# Patient Record
Sex: FEMALE | Race: WHITE | HISPANIC OR LATINO | Employment: FULL TIME | URBAN - METROPOLITAN AREA
[De-identification: names, ages, dates, MRNs, and addresses within clinical notes are randomized per-mention and may not be internally consistent; named-entity substitution may affect disease eponyms.]

---

## 2019-09-16 ENCOUNTER — INITIAL PRENATAL (OUTPATIENT)
Dept: OBSTETRICS AND GYNECOLOGY | Facility: CLINIC | Age: 29
End: 2019-09-16
Payer: MEDICAID

## 2019-09-16 VITALS — SYSTOLIC BLOOD PRESSURE: 112 MMHG | DIASTOLIC BLOOD PRESSURE: 62 MMHG | WEIGHT: 130.06 LBS

## 2019-09-16 DIAGNOSIS — Z3A.11 11 WEEKS GESTATION OF PREGNANCY: Primary | ICD-10-CM

## 2019-09-16 DIAGNOSIS — Z34.81 NORMAL PREGNANCY IN MULTIGRAVIDA IN FIRST TRIMESTER: ICD-10-CM

## 2019-09-16 DIAGNOSIS — O02.0 BLIGHTED OVUM: ICD-10-CM

## 2019-09-16 PROCEDURE — 99204 PR OFFICE/OUTPT VISIT, NEW, LEVL IV, 45-59 MIN: ICD-10-PCS | Mod: S$PBB,TH,25, | Performed by: OBSTETRICS & GYNECOLOGY

## 2019-09-16 PROCEDURE — 99999 PR PBB SHADOW E&M-NEW PATIENT-LVL II: CPT | Mod: PBBFAC,,, | Performed by: OBSTETRICS & GYNECOLOGY

## 2019-09-16 PROCEDURE — 99999 PR PBB SHADOW E&M-NEW PATIENT-LVL II: ICD-10-PCS | Mod: PBBFAC,,, | Performed by: OBSTETRICS & GYNECOLOGY

## 2019-09-16 PROCEDURE — 76815 OB US LIMITED FETUS(S): CPT | Mod: PBBFAC | Performed by: OBSTETRICS & GYNECOLOGY

## 2019-09-16 PROCEDURE — 76815 OB US LIMITED FETUS(S): CPT | Mod: 26,S$PBB,, | Performed by: OBSTETRICS & GYNECOLOGY

## 2019-09-16 PROCEDURE — 76815 PR  US,PREGNANT UTERUS,LIMITED, 1/> FETUSES: ICD-10-PCS | Mod: 26,S$PBB,, | Performed by: OBSTETRICS & GYNECOLOGY

## 2019-09-16 PROCEDURE — 99204 OFFICE O/P NEW MOD 45 MIN: CPT | Mod: S$PBB,TH,25, | Performed by: OBSTETRICS & GYNECOLOGY

## 2019-09-16 PROCEDURE — 99202 OFFICE O/P NEW SF 15 MIN: CPT | Mod: PBBFAC,TH,25 | Performed by: OBSTETRICS & GYNECOLOGY

## 2019-09-16 RX ORDER — PROMETHAZINE HYDROCHLORIDE 25 MG/1
TABLET ORAL
Qty: 60 TABLET | Refills: 1 | Status: SHIPPED | OUTPATIENT
Start: 2019-09-16

## 2019-09-16 NOTE — PROGRESS NOTES
Limited transvag sono for FHT:  No fetal pole seen on gestation sac measuring between 6 w 2 d and 8 wk 4 d    Discussed using [place] dx of probable blighted ovum.  Pt to have scheduled sono for confirmation on 9/19/19 with MFM.  Discussed expectant management vs induction with cytotec vs d&c; pt to f/u 1 wk to discuss options    Rx Phenergan for nausea and Rx PNV

## 2019-09-19 ENCOUNTER — PROCEDURE VISIT (OUTPATIENT)
Dept: MATERNAL FETAL MEDICINE | Facility: CLINIC | Age: 29
End: 2019-09-19
Payer: MEDICAID

## 2019-09-19 DIAGNOSIS — Z3A.11 11 WEEKS GESTATION OF PREGNANCY: ICD-10-CM

## 2019-09-19 PROCEDURE — 99499 NO LOS: ICD-10-PCS | Mod: S$PBB,,, | Performed by: OBSTETRICS & GYNECOLOGY

## 2019-09-19 PROCEDURE — 76801 OB US < 14 WKS SINGLE FETUS: CPT | Mod: 26,S$PBB,, | Performed by: OBSTETRICS & GYNECOLOGY

## 2019-09-19 PROCEDURE — 99499 UNLISTED E&M SERVICE: CPT | Mod: S$PBB,,, | Performed by: OBSTETRICS & GYNECOLOGY

## 2019-09-19 PROCEDURE — 76801 OB US < 14 WKS SINGLE FETUS: CPT | Mod: PBBFAC,PO | Performed by: OBSTETRICS & GYNECOLOGY

## 2019-09-19 PROCEDURE — 76801 PR US, OB <14WKS, TRANSABD, SINGLE GESTATION: ICD-10-PCS | Mod: 26,S$PBB,, | Performed by: OBSTETRICS & GYNECOLOGY

## 2019-09-23 ENCOUNTER — ROUTINE PRENATAL (OUTPATIENT)
Dept: OBSTETRICS AND GYNECOLOGY | Facility: CLINIC | Age: 29
End: 2019-09-23
Payer: MEDICAID

## 2019-09-23 VITALS — SYSTOLIC BLOOD PRESSURE: 106 MMHG | WEIGHT: 129.88 LBS | DIASTOLIC BLOOD PRESSURE: 51 MMHG

## 2019-09-23 DIAGNOSIS — O02.0 BLIGHTED OVUM: Primary | ICD-10-CM

## 2019-09-23 PROBLEM — Z34.81 NORMAL PREGNANCY IN MULTIGRAVIDA IN FIRST TRIMESTER: Status: RESOLVED | Noted: 2019-09-16 | Resolved: 2019-09-23

## 2019-09-23 PROCEDURE — 99999 PR PBB SHADOW E&M-EST. PATIENT-LVL II: CPT | Mod: PBBFAC,,, | Performed by: OBSTETRICS & GYNECOLOGY

## 2019-09-23 PROCEDURE — 99213 PR OFFICE/OUTPT VISIT, EST, LEVL III, 20-29 MIN: ICD-10-PCS | Mod: S$PBB,TH,, | Performed by: OBSTETRICS & GYNECOLOGY

## 2019-09-23 PROCEDURE — 99213 OFFICE O/P EST LOW 20 MIN: CPT | Mod: S$PBB,TH,, | Performed by: OBSTETRICS & GYNECOLOGY

## 2019-09-23 PROCEDURE — 76815 OB US LIMITED FETUS(S): CPT | Mod: PBBFAC | Performed by: OBSTETRICS & GYNECOLOGY

## 2019-09-23 PROCEDURE — 99999 PR PBB SHADOW E&M-EST. PATIENT-LVL II: ICD-10-PCS | Mod: PBBFAC,,, | Performed by: OBSTETRICS & GYNECOLOGY

## 2019-09-23 PROCEDURE — 99212 OFFICE O/P EST SF 10 MIN: CPT | Mod: PBBFAC,TH | Performed by: OBSTETRICS & GYNECOLOGY

## 2019-09-23 RX ORDER — MISOPROSTOL 200 UG/1
400 TABLET ORAL EVERY 6 HOURS
Qty: 8 TABLET | Refills: 0 | Status: ON HOLD | OUTPATIENT
Start: 2019-09-23 | End: 2019-09-25 | Stop reason: HOSPADM

## 2019-09-23 RX ORDER — IBUPROFEN 800 MG/1
800 TABLET ORAL EVERY 8 HOURS PRN
Qty: 30 TABLET | Refills: 1 | Status: SHIPPED | OUTPATIENT
Start: 2019-09-23 | End: 2020-09-22

## 2019-09-23 NOTE — PROGRESS NOTES
Pt here for f/u of blighted ovum, pt states she has been having cramps and mild bleeding since 9/20/19.    Limited transvag sono:  Gestation sac seen between 7 and 9 wks, no fetal pole or any contents seen within sac.    Discussed expectant management vs cytotec vs D&C.  Pt chooses cytotec, 400 mcg qid x 24 hours, pt to f/u in 1 wk for repeat sono.  If blighted ovum still present, pt to have D&C next week.

## 2019-09-24 ENCOUNTER — HOSPITAL ENCOUNTER (EMERGENCY)
Facility: HOSPITAL | Age: 29
Discharge: HOME | End: 2019-09-25
Attending: EMERGENCY MEDICINE
Payer: MEDICAID

## 2019-09-24 DIAGNOSIS — N93.9 VAGINAL BLEEDING: ICD-10-CM

## 2019-09-24 DIAGNOSIS — O03.4 INCOMPLETE ABORTION: Primary | ICD-10-CM

## 2019-09-24 DIAGNOSIS — O02.0 BLIGHTED OVUM: ICD-10-CM

## 2019-09-24 LAB
ABO + RH BLD: NORMAL
ALBUMIN SERPL BCP-MCNC: 3.7 G/DL (ref 3.5–5.2)
ALP SERPL-CCNC: 62 U/L (ref 55–135)
ALT SERPL W/O P-5'-P-CCNC: 21 U/L (ref 10–44)
ANION GAP SERPL CALC-SCNC: 8 MMOL/L (ref 8–16)
AST SERPL-CCNC: 23 U/L (ref 10–40)
BACTERIA #/AREA URNS HPF: ABNORMAL /HPF
BASOPHILS # BLD AUTO: 0.01 K/UL (ref 0–0.2)
BASOPHILS NFR BLD: 0.1 % (ref 0–1.9)
BILIRUB SERPL-MCNC: 0.4 MG/DL (ref 0.1–1)
BILIRUB UR QL STRIP: NEGATIVE
BLD GP AB SCN CELLS X3 SERPL QL: NORMAL
BUN SERPL-MCNC: 8 MG/DL (ref 6–20)
CALCIUM SERPL-MCNC: 8.8 MG/DL (ref 8.7–10.5)
CHLORIDE SERPL-SCNC: 105 MMOL/L (ref 95–110)
CLARITY UR: ABNORMAL
CO2 SERPL-SCNC: 20 MMOL/L (ref 23–29)
COLOR UR: ABNORMAL
CREAT SERPL-MCNC: 0.7 MG/DL (ref 0.5–1.4)
DIFFERENTIAL METHOD: ABNORMAL
EOSINOPHIL # BLD AUTO: 0.1 K/UL (ref 0–0.5)
EOSINOPHIL NFR BLD: 1.1 % (ref 0–8)
ERYTHROCYTE [DISTWIDTH] IN BLOOD BY AUTOMATED COUNT: 12.9 % (ref 11.5–14.5)
EST. GFR  (AFRICAN AMERICAN): >60 ML/MIN/1.73 M^2
EST. GFR  (NON AFRICAN AMERICAN): >60 ML/MIN/1.73 M^2
GLUCOSE SERPL-MCNC: 113 MG/DL (ref 70–110)
GLUCOSE UR QL STRIP: ABNORMAL
HCG INTACT+B SERPL-ACNC: 295 MIU/ML
HCT VFR BLD AUTO: 28.4 % (ref 37–48.5)
HGB BLD-MCNC: 9.6 G/DL (ref 12–16)
HGB UR QL STRIP: ABNORMAL
HYALINE CASTS #/AREA URNS LPF: 0 /LPF
KETONES UR QL STRIP: NEGATIVE
LEUKOCYTE ESTERASE UR QL STRIP: ABNORMAL
LIPASE SERPL-CCNC: 7 U/L (ref 4–60)
LYMPHOCYTES # BLD AUTO: 2 K/UL (ref 1–4.8)
LYMPHOCYTES NFR BLD: 22.3 % (ref 18–48)
MCH RBC QN AUTO: 30.4 PG (ref 27–31)
MCHC RBC AUTO-ENTMCNC: 33.8 G/DL (ref 32–36)
MCV RBC AUTO: 90 FL (ref 82–98)
MICROSCOPIC COMMENT: ABNORMAL
MONOCYTES # BLD AUTO: 0.8 K/UL (ref 0.3–1)
MONOCYTES NFR BLD: 8.4 % (ref 4–15)
NEUTROPHILS # BLD AUTO: 6.1 K/UL (ref 1.8–7.7)
NEUTROPHILS NFR BLD: 68.2 % (ref 38–73)
NITRITE UR QL STRIP: NEGATIVE
PH UR STRIP: 6 [PH] (ref 5–8)
PLATELET # BLD AUTO: 260 K/UL (ref 150–350)
PMV BLD AUTO: 10.3 FL (ref 9.2–12.9)
POTASSIUM SERPL-SCNC: 3.8 MMOL/L (ref 3.5–5.1)
PROT SERPL-MCNC: 6.5 G/DL (ref 6–8.4)
PROT UR QL STRIP: ABNORMAL
RBC # BLD AUTO: 3.16 M/UL (ref 4–5.4)
RBC #/AREA URNS HPF: >100 /HPF (ref 0–4)
SODIUM SERPL-SCNC: 133 MMOL/L (ref 136–145)
SP GR UR STRIP: 1.01 (ref 1–1.03)
URN SPEC COLLECT METH UR: ABNORMAL
UROBILINOGEN UR STRIP-ACNC: NEGATIVE EU/DL
WBC # BLD AUTO: 8.97 K/UL (ref 3.9–12.7)
WBC #/AREA URNS HPF: 0 /HPF (ref 0–5)

## 2019-09-24 PROCEDURE — 83690 ASSAY OF LIPASE: CPT

## 2019-09-24 PROCEDURE — 80053 COMPREHEN METABOLIC PANEL: CPT

## 2019-09-24 PROCEDURE — 63600175 PHARM REV CODE 636 W HCPCS: Performed by: EMERGENCY MEDICINE

## 2019-09-24 PROCEDURE — 96374 THER/PROPH/DIAG INJ IV PUSH: CPT

## 2019-09-24 PROCEDURE — 96361 HYDRATE IV INFUSION ADD-ON: CPT

## 2019-09-24 PROCEDURE — 81000 URINALYSIS NONAUTO W/SCOPE: CPT

## 2019-09-24 PROCEDURE — 96375 TX/PRO/DX INJ NEW DRUG ADDON: CPT | Mod: 59

## 2019-09-24 PROCEDURE — 85025 COMPLETE CBC W/AUTO DIFF WBC: CPT

## 2019-09-24 PROCEDURE — 86850 RBC ANTIBODY SCREEN: CPT

## 2019-09-24 PROCEDURE — 84702 CHORIONIC GONADOTROPIN TEST: CPT

## 2019-09-24 PROCEDURE — 99284 EMERGENCY DEPT VISIT MOD MDM: CPT | Mod: 25

## 2019-09-24 RX ORDER — ONDANSETRON 2 MG/ML
4 INJECTION INTRAMUSCULAR; INTRAVENOUS
Status: COMPLETED | OUTPATIENT
Start: 2019-09-24 | End: 2019-09-24

## 2019-09-24 RX ORDER — MORPHINE SULFATE 10 MG/ML
2 INJECTION INTRAMUSCULAR; INTRAVENOUS; SUBCUTANEOUS
Status: COMPLETED | OUTPATIENT
Start: 2019-09-24 | End: 2019-09-24

## 2019-09-24 RX ORDER — FENTANYL CITRATE 50 UG/ML
50 INJECTION, SOLUTION INTRAMUSCULAR; INTRAVENOUS
Status: COMPLETED | OUTPATIENT
Start: 2019-09-24 | End: 2019-09-24

## 2019-09-24 RX ADMIN — FENTANYL CITRATE 50 MCG: 50 INJECTION INTRAMUSCULAR; INTRAVENOUS at 08:09

## 2019-09-24 RX ADMIN — ONDANSETRON 4 MG: 2 INJECTION INTRAMUSCULAR; INTRAVENOUS at 08:09

## 2019-09-24 RX ADMIN — SODIUM CHLORIDE 1000 ML: 0.9 INJECTION, SOLUTION INTRAVENOUS at 08:09

## 2019-09-24 RX ADMIN — MORPHINE SULFATE 2 MG: 10 INJECTION INTRAVENOUS at 11:09

## 2019-09-25 ENCOUNTER — ANESTHESIA EVENT (OUTPATIENT)
Dept: SURGERY | Facility: HOSPITAL | Age: 29
End: 2019-09-25
Payer: MEDICAID

## 2019-09-25 ENCOUNTER — ANESTHESIA (OUTPATIENT)
Dept: SURGERY | Facility: HOSPITAL | Age: 29
End: 2019-09-25
Payer: MEDICAID

## 2019-09-25 VITALS
DIASTOLIC BLOOD PRESSURE: 68 MMHG | HEART RATE: 76 BPM | WEIGHT: 127.88 LBS | RESPIRATION RATE: 19 BRPM | HEIGHT: 64 IN | BODY MASS INDEX: 21.83 KG/M2 | OXYGEN SATURATION: 100 % | TEMPERATURE: 98 F | SYSTOLIC BLOOD PRESSURE: 110 MMHG

## 2019-09-25 PROBLEM — O03.4 INCOMPLETE ABORTION: Status: ACTIVE | Noted: 2019-09-25

## 2019-09-25 PROCEDURE — 88305 TISSUE SPECIMEN TO PATHOLOGY - SURGERY: ICD-10-PCS | Mod: 26,,, | Performed by: PATHOLOGY

## 2019-09-25 PROCEDURE — 36000704 HC OR TIME LEV I 1ST 15 MIN: Performed by: OBSTETRICS & GYNECOLOGY

## 2019-09-25 PROCEDURE — D9220A PRA ANESTHESIA: ICD-10-PCS | Mod: CRNA,,, | Performed by: NURSE ANESTHETIST, CERTIFIED REGISTERED

## 2019-09-25 PROCEDURE — 88305 TISSUE EXAM BY PATHOLOGIST: CPT | Mod: 26,,, | Performed by: PATHOLOGY

## 2019-09-25 PROCEDURE — 71000039 HC RECOVERY, EACH ADD'L HOUR: Performed by: OBSTETRICS & GYNECOLOGY

## 2019-09-25 PROCEDURE — 37000009 HC ANESTHESIA EA ADD 15 MINS: Performed by: OBSTETRICS & GYNECOLOGY

## 2019-09-25 PROCEDURE — 25000003 PHARM REV CODE 250: Performed by: OBSTETRICS & GYNECOLOGY

## 2019-09-25 PROCEDURE — 59820 PR SURG RX MISSED ABORTN,1ST TRI: ICD-10-PCS | Mod: ,,, | Performed by: OBSTETRICS & GYNECOLOGY

## 2019-09-25 PROCEDURE — D9220A PRA ANESTHESIA: Mod: ANES,,, | Performed by: ANESTHESIOLOGY

## 2019-09-25 PROCEDURE — 63600175 PHARM REV CODE 636 W HCPCS: Performed by: NURSE ANESTHETIST, CERTIFIED REGISTERED

## 2019-09-25 PROCEDURE — 88305 TISSUE EXAM BY PATHOLOGIST: CPT | Performed by: PATHOLOGY

## 2019-09-25 PROCEDURE — 63600175 PHARM REV CODE 636 W HCPCS: Performed by: ANESTHESIOLOGY

## 2019-09-25 PROCEDURE — 00940 ANES VAGINAL PX NOS: CPT | Performed by: OBSTETRICS & GYNECOLOGY

## 2019-09-25 PROCEDURE — 59812 PR SURG RX INCOMPLETE ABORTN: ICD-10-PCS | Mod: ,,, | Performed by: OBSTETRICS & GYNECOLOGY

## 2019-09-25 PROCEDURE — 36000705 HC OR TIME LEV I EA ADD 15 MIN: Performed by: OBSTETRICS & GYNECOLOGY

## 2019-09-25 PROCEDURE — D9220A PRA ANESTHESIA: Mod: CRNA,,, | Performed by: NURSE ANESTHETIST, CERTIFIED REGISTERED

## 2019-09-25 PROCEDURE — D9220A PRA ANESTHESIA: ICD-10-PCS | Mod: ANES,,, | Performed by: ANESTHESIOLOGY

## 2019-09-25 PROCEDURE — 37000008 HC ANESTHESIA 1ST 15 MINUTES: Performed by: OBSTETRICS & GYNECOLOGY

## 2019-09-25 PROCEDURE — 59812 TREATMENT OF MISCARRIAGE: CPT | Mod: ,,, | Performed by: OBSTETRICS & GYNECOLOGY

## 2019-09-25 PROCEDURE — 71000033 HC RECOVERY, INTIAL HOUR: Performed by: OBSTETRICS & GYNECOLOGY

## 2019-09-25 PROCEDURE — 59820 CARE OF MISCARRIAGE: CPT | Mod: ,,, | Performed by: OBSTETRICS & GYNECOLOGY

## 2019-09-25 RX ORDER — ACETAMINOPHEN 10 MG/ML
1000 INJECTION, SOLUTION INTRAVENOUS ONCE
Status: COMPLETED | OUTPATIENT
Start: 2019-09-25 | End: 2019-09-25

## 2019-09-25 RX ORDER — ONDANSETRON 8 MG/1
8 TABLET, ORALLY DISINTEGRATING ORAL EVERY 8 HOURS PRN
Status: CANCELLED | OUTPATIENT
Start: 2019-09-25

## 2019-09-25 RX ORDER — METHYLERGONOVINE MALEATE 0.2 MG/1
0.2 TABLET ORAL EVERY 6 HOURS
Status: CANCELLED | OUTPATIENT
Start: 2019-09-25

## 2019-09-25 RX ORDER — HYDROMORPHONE HYDROCHLORIDE 2 MG/ML
0.2 INJECTION, SOLUTION INTRAMUSCULAR; INTRAVENOUS; SUBCUTANEOUS EVERY 5 MIN PRN
Status: DISCONTINUED | OUTPATIENT
Start: 2019-09-25 | End: 2019-09-25 | Stop reason: HOSPADM

## 2019-09-25 RX ORDER — METHYLERGONOVINE MALEATE 0.2 MG/ML
INJECTION INTRAVENOUS
Status: DISCONTINUED | OUTPATIENT
Start: 2019-09-25 | End: 2019-09-25

## 2019-09-25 RX ORDER — METHYLERGONOVINE MALEATE 0.2 MG/1
0.2 TABLET ORAL EVERY 6 HOURS
Qty: 21 TABLET | Refills: 0 | Status: SHIPPED | OUTPATIENT
Start: 2019-09-25 | End: 2019-09-26

## 2019-09-25 RX ORDER — HYDROCODONE BITARTRATE AND ACETAMINOPHEN 5; 325 MG/1; MG/1
1 TABLET ORAL EVERY 4 HOURS PRN
Status: DISCONTINUED | OUTPATIENT
Start: 2019-09-25 | End: 2019-09-25 | Stop reason: HOSPADM

## 2019-09-25 RX ORDER — PROPOFOL 10 MG/ML
VIAL (ML) INTRAVENOUS
Status: DISCONTINUED | OUTPATIENT
Start: 2019-09-25 | End: 2019-09-25

## 2019-09-25 RX ORDER — FENTANYL CITRATE 50 UG/ML
25 INJECTION, SOLUTION INTRAMUSCULAR; INTRAVENOUS EVERY 5 MIN PRN
Status: DISCONTINUED | OUTPATIENT
Start: 2019-09-25 | End: 2019-09-25 | Stop reason: HOSPADM

## 2019-09-25 RX ORDER — FENTANYL CITRATE 50 UG/ML
INJECTION, SOLUTION INTRAMUSCULAR; INTRAVENOUS
Status: DISCONTINUED | OUTPATIENT
Start: 2019-09-25 | End: 2019-09-25

## 2019-09-25 RX ORDER — DEXAMETHASONE SODIUM PHOSPHATE 4 MG/ML
INJECTION, SOLUTION INTRA-ARTICULAR; INTRALESIONAL; INTRAMUSCULAR; INTRAVENOUS; SOFT TISSUE
Status: DISCONTINUED | OUTPATIENT
Start: 2019-09-25 | End: 2019-09-25

## 2019-09-25 RX ORDER — METHYLERGONOVINE MALEATE 0.2 MG/1
TABLET ORAL
Status: DISCONTINUED | OUTPATIENT
Start: 2019-09-25 | End: 2019-09-25 | Stop reason: HOSPADM

## 2019-09-25 RX ORDER — IBUPROFEN 600 MG/1
600 TABLET ORAL EVERY 6 HOURS PRN
Status: CANCELLED | OUTPATIENT
Start: 2019-09-25

## 2019-09-25 RX ORDER — SODIUM CHLORIDE 9 MG/ML
INJECTION, SOLUTION INTRAVENOUS CONTINUOUS PRN
Status: DISCONTINUED | OUTPATIENT
Start: 2019-09-25 | End: 2019-09-25

## 2019-09-25 RX ORDER — OXYTOCIN 10 [USP'U]/ML
INJECTION, SOLUTION INTRAMUSCULAR; INTRAVENOUS
Status: DISCONTINUED | OUTPATIENT
Start: 2019-09-25 | End: 2019-09-25

## 2019-09-25 RX ORDER — HYDROMORPHONE HYDROCHLORIDE 2 MG/ML
1 INJECTION, SOLUTION INTRAMUSCULAR; INTRAVENOUS; SUBCUTANEOUS EVERY 4 HOURS PRN
Status: CANCELLED | OUTPATIENT
Start: 2019-09-25

## 2019-09-25 RX ORDER — DIPHENHYDRAMINE HCL 25 MG
25 CAPSULE ORAL EVERY 4 HOURS PRN
Status: CANCELLED | OUTPATIENT
Start: 2019-09-25

## 2019-09-25 RX ORDER — MIDAZOLAM HYDROCHLORIDE 1 MG/ML
INJECTION, SOLUTION INTRAMUSCULAR; INTRAVENOUS
Status: DISCONTINUED | OUTPATIENT
Start: 2019-09-25 | End: 2019-09-25

## 2019-09-25 RX ORDER — LIDOCAINE HCL/PF 100 MG/5ML
SYRINGE (ML) INTRAVENOUS
Status: DISCONTINUED | OUTPATIENT
Start: 2019-09-25 | End: 2019-09-25

## 2019-09-25 RX ORDER — ONDANSETRON 2 MG/ML
INJECTION INTRAMUSCULAR; INTRAVENOUS
Status: DISCONTINUED | OUTPATIENT
Start: 2019-09-25 | End: 2019-09-25

## 2019-09-25 RX ORDER — SODIUM CHLORIDE 0.9 % (FLUSH) 0.9 %
3 SYRINGE (ML) INJECTION
Status: DISCONTINUED | OUTPATIENT
Start: 2019-09-25 | End: 2019-09-25 | Stop reason: HOSPADM

## 2019-09-25 RX ORDER — DIPHENHYDRAMINE HYDROCHLORIDE 50 MG/ML
25 INJECTION INTRAMUSCULAR; INTRAVENOUS EVERY 4 HOURS PRN
Status: CANCELLED | OUTPATIENT
Start: 2019-09-25

## 2019-09-25 RX ORDER — SUCCINYLCHOLINE CHLORIDE 20 MG/ML
INJECTION INTRAMUSCULAR; INTRAVENOUS
Status: DISCONTINUED | OUTPATIENT
Start: 2019-09-25 | End: 2019-09-25

## 2019-09-25 RX ORDER — SODIUM CHLORIDE, SODIUM LACTATE, POTASSIUM CHLORIDE, CALCIUM CHLORIDE 600; 310; 30; 20 MG/100ML; MG/100ML; MG/100ML; MG/100ML
INJECTION, SOLUTION INTRAVENOUS CONTINUOUS PRN
Status: DISCONTINUED | OUTPATIENT
Start: 2019-09-25 | End: 2019-09-25

## 2019-09-25 RX ADMIN — LIDOCAINE HYDROCHLORIDE 100 MG: 20 INJECTION, SOLUTION INTRAVENOUS at 01:09

## 2019-09-25 RX ADMIN — SODIUM CHLORIDE, SODIUM LACTATE, POTASSIUM CHLORIDE, AND CALCIUM CHLORIDE: .6; .31; .03; .02 INJECTION, SOLUTION INTRAVENOUS at 01:09

## 2019-09-25 RX ADMIN — HYDROMORPHONE HYDROCHLORIDE 0.2 MG: 2 INJECTION, SOLUTION INTRAMUSCULAR; INTRAVENOUS; SUBCUTANEOUS at 02:09

## 2019-09-25 RX ADMIN — ONDANSETRON 4 MG: 2 INJECTION, SOLUTION INTRAMUSCULAR; INTRAVENOUS at 01:09

## 2019-09-25 RX ADMIN — PROPOFOL 150 MG: 10 INJECTION, EMULSION INTRAVENOUS at 01:09

## 2019-09-25 RX ADMIN — ACETAMINOPHEN 1000 MG: 10 INJECTION, SOLUTION INTRAVENOUS at 02:09

## 2019-09-25 RX ADMIN — MIDAZOLAM HYDROCHLORIDE 2 MG: 1 INJECTION, SOLUTION INTRAMUSCULAR; INTRAVENOUS at 01:09

## 2019-09-25 RX ADMIN — DEXAMETHASONE SODIUM PHOSPHATE 4 MG: 4 INJECTION, SOLUTION INTRAMUSCULAR; INTRAVENOUS at 01:09

## 2019-09-25 RX ADMIN — OXYTOCIN 20 UNITS: 10 INJECTION, SOLUTION INTRAMUSCULAR; INTRAVENOUS at 01:09

## 2019-09-25 RX ADMIN — FENTANYL CITRATE 50 MCG: 50 INJECTION INTRAMUSCULAR; INTRAVENOUS at 01:09

## 2019-09-25 RX ADMIN — METHYLERGONOVINE MALEATE 200 MCG: 0.2 INJECTION, SOLUTION INTRAMUSCULAR; INTRAVENOUS at 01:09

## 2019-09-25 RX ADMIN — SODIUM CHLORIDE: 0.9 INJECTION, SOLUTION INTRAVENOUS at 01:09

## 2019-09-25 RX ADMIN — SUCCINYLCHOLINE CHLORIDE 120 MG: 20 INJECTION, SOLUTION INTRAMUSCULAR; INTRAVENOUS at 01:09

## 2019-09-25 RX ADMIN — HYDROCODONE BITARTRATE AND ACETAMINOPHEN 1 TABLET: 5; 325 TABLET ORAL at 02:09

## 2019-09-25 NOTE — ED PROVIDER NOTES
Encounter Date: 9/24/2019    SCRIBE #1 NOTE: I, Hunter Gonsalves, am scribing for, and in the presence of,  Usama Ramirez MD. I have scribed the following portions of the note - Other sections scribed: HPI, ROS, PE.       History     Chief Complaint   Patient presents with    Vaginal Bleeding     Pt 13 weeks pregnant with vaginal bleeding x 2 days. Soaking 3 pads per hour. Also reports lower abdominal cramping. Pt pale in triage.     CC: Vaginal Bleeding    HPI: 30 y/o female, with no pertinent PMHx, presents to the ED c/o vaginal bleeding. Pt reports that the vaginal bleeding began Friday, but worsened last night. Pt states that she visited Dr. Chavez yesterday and prescribed her Cytotec. Pt notes that she took the medicine around 8:30PM and after that the heavy bleeding began. Pt reports that she is going through 3/4 pads per hour. Pt states that she is experiencing associated abdominal pain, headache, and generalized weakness. Pt did not attempt any tx PTA. PSHx includes hernia repair. Pt has no additional complaints at this time.     The history is provided by the patient. A  was used (Brother-In-Law).     Review of patient's allergies indicates:  No Known Allergies  History reviewed. No pertinent past medical history.  Past Surgical History:   Procedure Laterality Date    DILATION AND CURETTAGE OF UTERUS      HERNIA REPAIR       History reviewed. No pertinent family history.  Social History     Tobacco Use    Smoking status: Never Smoker    Smokeless tobacco: Never Used   Substance Use Topics    Alcohol use: Never     Frequency: Never    Drug use: Never     Review of Systems   Constitutional: Negative.    HENT: Negative.    Eyes: Negative.    Respiratory: Negative.    Cardiovascular: Negative.    Gastrointestinal: Positive for abdominal pain.   Genitourinary: Positive for vaginal bleeding.   Musculoskeletal: Negative.    Skin: Negative.    Neurological: Positive for  weakness (generalized) and headaches.       Physical Exam     Initial Vitals [09/24/19 1943]   BP Pulse Resp Temp SpO2   (!) 91/50 (!) 116 18 98.3 °F (36.8 °C) 100 %      MAP       --         Physical Exam    Constitutional: She appears well-developed and well-nourished. She is not diaphoretic. No distress.   HENT:   Head: Normocephalic and atraumatic.   Nose: Nose normal.   Eyes: EOM are normal. Pupils are equal, round, and reactive to light.   Neck: Normal range of motion. Neck supple. No JVD present.   Cardiovascular: Regular rhythm and normal heart sounds. Tachycardia present.    No murmur heard.  Pulmonary/Chest: Breath sounds normal. No stridor. No respiratory distress. She has no wheezes. She has no rales.   Abdominal: Soft. Bowel sounds are normal. She exhibits no distension. There is tenderness in the suprapubic area.   Genitourinary:   Genitourinary Comments: Cervical os with noted active dark hemorrhage, without clots or further abnormality noted, filling vaginal vault x 1 on initial exam, no POC noted   Musculoskeletal: Normal range of motion. She exhibits no edema or tenderness.   Neurological: She is alert and oriented to person, place, and time. No cranial nerve deficit.   Skin: Skin is warm and dry. Capillary refill takes less than 2 seconds. No rash noted. No erythema. There is pallor.         ED Course   Procedures  Labs Reviewed   CBC W/ AUTO DIFFERENTIAL - Abnormal; Notable for the following components:       Result Value    RBC 3.16 (*)     Hemoglobin 9.6 (*)     Hematocrit 28.4 (*)     All other components within normal limits   COMPREHENSIVE METABOLIC PANEL - Abnormal; Notable for the following components:    Sodium 133 (*)     CO2 20 (*)     Glucose 113 (*)     All other components within normal limits   URINALYSIS, REFLEX TO URINE CULTURE - Abnormal; Notable for the following components:    Color, UA Red (*)     Appearance, UA Cloudy (*)     Protein, UA 3+ (*)     Glucose, UA 1+ (*)      Occult Blood UA 3+ (*)     Leukocytes, UA Trace (*)     All other components within normal limits    Narrative:     Preferred Collection Type->Urine, Clean Catch   URINALYSIS MICROSCOPIC - Abnormal; Notable for the following components:    RBC, UA >100 (*)     All other components within normal limits    Narrative:     Preferred Collection Type->Urine, Clean Catch   LIPASE   HCG, QUANTITATIVE, PREGNANCY   TYPE & SCREEN          Imaging Results          US OB Less Than 14 Wks First Gestation (Final result)  Result time 19 23:02:39    Final result by Thomas Hair MD (19 23:02:39)                 Impression:      No IUP identified.  There is a approximately 4.4 x 3 x 4.2 cm area of heterogeneous tissue and fluid in the endometrium of the cervix and lower uterine segment.  This could represent  in progress with products of conception.  No fetal pole or yolk sac or fetal heartbeat identified.  Recommend close interval imaging and or laboratory surveillance to assess for IUP versus  in progress.    Normal bilateral ovaries.      Electronically signed by: Thomas Hair  Date:    2019  Time:    23:02             Narrative:    EXAMINATION:  US OB LESS THAN 14 WEEKS FIRST GESTATION    CLINICAL HISTORY:  Abnormal uterine and vaginal bleeding, unspecified    TECHNIQUE:  Transvaginal and trans abdominopelvic ultrasound.  Grayscale and color Doppler and spectral Doppler is performed.    COMPARISON:  None    FINDINGS:  Transabdominal pelvic ultrasound: Morison's pouch shows no free fluid.  Uterus measures 9.6 by 8.2 x 6.2 cm and appears anteflexed.  The endometrium is thickened and irregular measuring up to 2.1 cm.  There may be fluid in the lower uterine segment endometrium.  There is a 3.5 x 3.5 cm echogenic area of the endometrium which appears heterogeneous.    Right ovary measures 3.1 by 1.7 x 2.7 cm and shows normal color Doppler and spectral arterial and spectral venous waveforms.  The  left ovary measures 2.8 x 1.9 x 2.4 cm and shows normal color Doppler with normal arterial and venous spectral waveform.    Transvaginal pelvic ultrasound: There is fluid of the endocervical canal.  No definite gestational sac or fetal pole or IUP or yolk sac identified.  In the fundus, the endometrium measures up to 1.4 cm in thickness.  The heterogeneous material in the endometrium of the cervix and lower in segment measures about 3 x 4.2 by a 4.4 cm.  This could be retained products of conception.    The right ovary measures 3.1 x 1.6 by 2.9 cm and has a hypoechoic 1.7 x 1 cm area within it, likely hemorrhagic cyst.  There is normal color Doppler flow of the right ovary.  There is normal spectral arterial and venous waveform of the right ovary.    The left ovary measures 3.4 by 2 x 2.5 cm and appears normal with normal color Doppler flow and normal arterial and venous spectral waveforms.    No free fluid.                                 Medical Decision Making:   Clinical Tests:   Lab Tests: Ordered and Reviewed  The following lab test(s) were unremarkable: CBC and CMP       <> Summary of Lab: Beta HCG review  Radiological Study: Ordered and Reviewed  ED Management:  2330:  Tissue present and cervix and lower uterine segment on ultrasound.  Patient still having moderate amount of bleeding passing clots through an open os on exam Pt exam.  Discussed with Dr. Crandall.  Will take for D&C tonight.  Explained results and plan to patient.   used.  Patient will be discharged after this.   29-year-old female  003 at around 12 weeks gestation presents with vaginal bleeding x2 days, and day 2 of Cytotec therapy for recent diagnosis of blighted ovum.  Physical exam showing pale patient, tachycardic, with noted active hemorrhage on pelvic exam, and mild lower abdominal cramping pain, with some mild tenderness otherwise abdomen soft.  Hemoglobin 9.6, CMP within normal limits, hCG quant 295, lipase 7, pending vaginal  ultrasound at this time.  Intrauterine gestational sac noted on prior transvaginal ultrasound.  Patient given 2 rounds of pain medication, and 2 L of normal saline with some improvement in symptoms.  Pending ultrasound results will discuss with OB for further recommendations. [2200]          Scribe Attestation:   Scribe #1: I performed the above scribed service and the documentation accurately describes the services I performed. I attest to the accuracy of the note.               Clinical Impression:       ICD-10-CM ICD-9-CM   1. Incomplete  O03.4 637.91   2. Vaginal bleeding N93.9 623.8   3. Blighted ovum O02.0 631.8         Disposition:   Disposition: Discharged  Condition: Stable           I, Robbin Magaña M.D., personally performed the services described in this documentation. All medical record entries made by the scribe were at my direction and in my presence.  I have reviewed the chart and agree that the record reflects my personal performance and is accurate and complete.             Usama Ramirez MD  [id number]       Robbin Magaña MD  10/01/19 0905

## 2019-09-25 NOTE — ANESTHESIA POSTPROCEDURE EVALUATION
Anesthesia Post Evaluation    Patient: Adriana Mccord    Procedure(s) Performed: Procedure(s) (LRB):  DILATION AND CURETTAGE, UTERUS, USING SUCTION, ENDOMETRIAL CURETTAGE (N/A)    Final Anesthesia Type: general  Patient location during evaluation: PACU  Patient participation: Yes- Able to Participate  Level of consciousness: awake and alert  Post-procedure vital signs: reviewed and stable  Pain management: adequate  Airway patency: patent  PONV status at discharge: No PONV  Anesthetic complications: no      Cardiovascular status: blood pressure returned to baseline and hemodynamically stable  Respiratory status: unassisted and spontaneous ventilation  Hydration status: euvolemic  Follow-up not needed.          Vitals Value Taken Time   BP 96/60 9/25/2019  2:58 AM   Temp 36.6 °C (97.9 °F) 9/25/2019  2:10 AM   Pulse 80 9/25/2019  3:07 AM   Resp 15 9/25/2019  3:07 AM   SpO2 100 % 9/25/2019  3:07 AM   Vitals shown include unvalidated device data.      Event Time     Out of Recovery 09/25/2019 03:30:00          Pain/Bernie Score: Pain Rating Prior to Med Admin: 5 (9/25/2019  2:58 AM)  Pain Rating Post Med Admin: 7 (9/25/2019  2:48 AM)

## 2019-09-25 NOTE — H&P
[facility]  Obstetrics  History & Physical    Patient Name: Adriana Mccord  MRN: [id number]  Admission Date: 2019  Primary Care Provider: Sumi Coffman MD    Subjective:     Principal Problem:Incomplete     History of Present Illness:  Pt is a 28 yo  with blighted ovum measured around 7-9 weeks gestation.  She was prescribed cytotec for medical management.  Pt reported heavy bleeding onset for the past 24 hours where she changes 3-4 pads/hour. Unsure if she passed POC.  Pelvic US in the ED shows retained POC    Obstetric HPI:      OB History    Para Term  AB Living   3 2 2 0 0 2   SAB TAB Ectopic Multiple Live Births   0 0 0 0 2      # Outcome Date GA Lbr Villa/2nd Weight Sex Delivery Anes PTL Lv   3 Current            2 Term 03/07/10 40w0d   M Vag-Spont  N BEBE   1 Term 09/15/06 40w0d   F Vag-Spont  N BEBE     History reviewed. No pertinent past medical history.  Past Surgical History:   Procedure Laterality Date    HERNIA REPAIR           (Not in a hospital admission)    Review of patient's allergies indicates:  No Known Allergies     Family History     None        Tobacco Use    Smoking status: Never Smoker    Smokeless tobacco: Never Used   Substance and Sexual Activity    Alcohol use: Never     Frequency: Never    Drug use: Never    Sexual activity: Yes     Partners: Male     Review of Systems   Constitutional: Negative for appetite change, chills and fever.   Respiratory: Negative for shortness of breath.    Cardiovascular: Negative for chest pain.   Gastrointestinal: Negative for abdominal pain, blood in stool, constipation, diarrhea, nausea and vomiting.   Endocrine: Negative for hair loss and hot flashes.   Genitourinary: Positive for pelvic pain and vaginal bleeding. Negative for decreased libido, dysmenorrhea, dyspareunia, dysuria, genital sores, menorrhagia, menstrual problem, vaginal discharge, vaginal pain, urinary incontinence and vaginal odor.    Musculoskeletal: Negative for back pain.   Integumentary:  Negative for rash, acne, hair changes, breast mass, nipple discharge and breast skin changes.   All other systems reviewed and are negative.  Breast: Negative for mass, mastodynia, nipple discharge and skin changes     Objective:     Vital Signs (Most Recent):  Temp: 98.3 °F (36.8 °C) (09/25/19 0013)  Pulse: 81 (09/25/19 0013)  Resp: 16 (09/25/19 0013)  BP: (!) 104/53 (09/25/19 0013)  SpO2: 100 % (09/25/19 0013) Vital Signs (24h Range):  Temp:  [98.3 °F (36.8 °C)] 98.3 °F (36.8 °C)  Pulse:  [] 81  Resp:  [16-18] 16  SpO2:  [98 %-100 %] 100 %  BP: ()/(46-67) 104/53     Weight: 58 kg (127 lb 13.9 oz)  Body mass index is 21.95 kg/m².      Physical Exam:   Constitutional: She is oriented to person, place, and time. She appears well-developed and well-nourished.    HENT:   Head: Normocephalic and atraumatic.    Eyes: Pupils are equal, round, and reactive to light. EOM are normal.     Cardiovascular: Exam reveals no clubbing and no cyanosis.     Pulmonary/Chest: Effort normal.        Abdominal: Soft. She exhibits no mass. There is no rebound and no guarding. No hernia.     Genitourinary:   Genitourinary Comments: Scant blood noted on the pad  Cervix appears dilated at 2 cm. POC palpated at KALYANI/internal os           Musculoskeletal: Normal range of motion and moves all extremeties.       Neurological: She is alert and oriented to person, place, and time.    Skin: Skin is warm. No cyanosis. Nails show no clubbing.    Psychiatric: She has a normal mood and affect. Her behavior is normal. Thought content normal.       Significant Labs:  Lab Results   Component Value Date    GROUPTRH O POS 09/24/2019       I have personallly reviewed all pertinent lab results from the last 24 hours.    EXAMINATION:  US OB LESS THAN 14 WEEKS FIRST GESTATION    CLINICAL HISTORY:  Abnormal uterine and vaginal bleeding, unspecified    TECHNIQUE:  Transvaginal and trans  abdominopelvic ultrasound.  Grayscale and color Doppler and spectral Doppler is performed.    COMPARISON:  None    FINDINGS:  Transabdominal pelvic ultrasound: Morison's pouch shows no free fluid.  Uterus measures 9.6 by 8.2 x 6.2 cm and appears anteflexed.  The endometrium is thickened and irregular measuring up to 2.1 cm.  There may be fluid in the lower uterine segment endometrium.  There is a 3.5 x 3.5 cm echogenic area of the endometrium which appears heterogeneous.    Right ovary measures 3.1 by 1.7 x 2.7 cm and shows normal color Doppler and spectral arterial and spectral venous waveforms.  The left ovary measures 2.8 x 1.9 x 2.4 cm and shows normal color Doppler with normal arterial and venous spectral waveform.    Transvaginal pelvic ultrasound: There is fluid of the endocervical canal.  No definite gestational sac or fetal pole or IUP or yolk sac identified.  In the fundus, the endometrium measures up to 1.4 cm in thickness.  The heterogeneous material in the endometrium of the cervix and lower in segment measures about 3 x 4.2 by a 4.4 cm.  This could be retained products of conception.    The right ovary measures 3.1 x 1.6 by 2.9 cm and has a hypoechoic 1.7 x 1 cm area within it, likely hemorrhagic cyst.  There is normal color Doppler flow of the right ovary.  There is normal spectral arterial and venous waveform of the right ovary.    The left ovary measures 3.4 by 2 x 2.5 cm and appears normal with normal color Doppler flow and normal arterial and venous spectral waveforms.    No free fluid.      Impression       No IUP identified.  There is a approximately 4.4 x 3 x 4.2 cm area of heterogeneous tissue and fluid in the endometrium of the cervix and lower uterine segment.  This could represent  in progress with products of conception.  No fetal pole or yolk sac or fetal heartbeat identified.  Recommend close interval imaging and or laboratory surveillance to assess for IUP versus  in  progress.    Normal bilateral ovaries.      Electronically signed by: Thomas Hair  Date: 2019  Time: 23:02       Assessment/Plan:     29 y.o. female  at 12w2d for:    Incomplete   S/p medical management with active bleeding and retained POC  Pt was counseled for suction D&C  She is consented, all questions answered to her satisfaction  Rh positive        Jay Chavarria MD  Obstetrics  [facility]

## 2019-09-25 NOTE — TRANSFER OF CARE
"Anesthesia Transfer of Care Note    Patient: Adriana Mccord    Procedure(s) Performed: Procedure(s) (LRB):  DILATION AND CURETTAGE, UTERUS, USING SUCTION, ENDOMETRIAL CURETTAGE (N/A)    Patient location: PACU    Anesthesia Type: general    Transport from OR: Transported from OR on room air with adequate spontaneous ventilation    Post pain: adequate analgesia    Post assessment: no apparent anesthetic complications and tolerated procedure well    Post vital signs: stable    Level of consciousness: sedated and responds to stimulation    Nausea/Vomiting: no nausea/vomiting    Complications: none    Transfer of care protocol was followed      Last vitals:   Visit Vitals  BP (!) 117/57 (BP Location: Left arm, Patient Position: Lying)   Pulse 97   Temp 36.6 °C (97.9 °F) (Oral)   Resp 12   Ht 5' 4" (1.626 m)   Wt 58 kg (127 lb 13.9 oz)   LMP 07/01/2019   SpO2 98%   BMI 21.95 kg/m²     "

## 2019-09-25 NOTE — ANESTHESIA PREPROCEDURE EVALUATION
09/25/2019  Adriana Mccord is a 29 y.o., female.    Anesthesia Evaluation     I have reviewed the Nursing Notes.      Review of Systems  Anesthesia Hx:  No problems with previous Anesthesia   Social:  Non-Smoker    Hematology/Oncology:         -- Anemia:   Cardiovascular:  Cardiovascular Normal Exercise tolerance: good     Pulmonary:  Pulmonary Normal    Renal/:  Renal/ Normal     Hepatic/GI:  Hepatic/GI Normal    OB/GYN/PEDS:  Vaginal bleeding   Neurological:  Neurology Normal    Endocrine:  Endocrine Normal        Physical Exam  General:  Well nourished    Airway/Jaw/Neck:  AIRWAY FINDINGS: Normal      Chest/Lungs:  Chest/Lungs Clear    Heart/Vascular:  Heart Findings: Normal       Mental Status:  Mental Status Findings: Normal        Anesthesia Plan  Type of Anesthesia, risks & benefits discussed:  Anesthesia Type:  general  Patient's Preference:   Intra-op Monitoring Plan: standard ASA monitors  Intra-op Monitoring Plan Comments:   Post Op Pain Control Plan:   Post Op Pain Control Plan Comments:   Induction:   rapid sequence and IV  Beta Blocker:  Patient is not currently on a Beta-Blocker (No further documentation required).       Informed Consent: Patient understands risks and agrees with Anesthesia plan.  Questions answered. Anesthesia consent signed with patient.  ASA Score: 2  emergent   Day of Surgery Review of History & Physical:  There are no significant changes.  H&P update referred to the provider.         Ready For Surgery From Anesthesia Perspective.

## 2019-09-25 NOTE — SUBJECTIVE & OBJECTIVE
Obstetric HPI:      OB History    Para Term  AB Living   3 2 2 0 0 2   SAB TAB Ectopic Multiple Live Births   0 0 0 0 2      # Outcome Date GA Lbr Villa/2nd Weight Sex Delivery Anes PTL Lv   3 Current            2 Term 03/07/10 40w0d   M Vag-Spont  N BEBE   1 Term 09/15/06 40w0d   F Vag-Spont  N BEBE     History reviewed. No pertinent past medical history.  Past Surgical History:   Procedure Laterality Date    HERNIA REPAIR           (Not in a hospital admission)    Review of patient's allergies indicates:  No Known Allergies     Family History     None        Tobacco Use    Smoking status: Never Smoker    Smokeless tobacco: Never Used   Substance and Sexual Activity    Alcohol use: Never     Frequency: Never    Drug use: Never    Sexual activity: Yes     Partners: Male     Review of Systems   Constitutional: Negative for appetite change, chills and fever.   Respiratory: Negative for shortness of breath.    Cardiovascular: Negative for chest pain.   Gastrointestinal: Negative for abdominal pain, blood in stool, constipation, diarrhea, nausea and vomiting.   Endocrine: Negative for hair loss and hot flashes.   Genitourinary: Positive for pelvic pain and vaginal bleeding. Negative for decreased libido, dysmenorrhea, dyspareunia, dysuria, genital sores, menorrhagia, menstrual problem, vaginal discharge, vaginal pain, urinary incontinence and vaginal odor.   Musculoskeletal: Negative for back pain.   Integumentary:  Negative for rash, acne, hair changes, breast mass, nipple discharge and breast skin changes.   All other systems reviewed and are negative.  Breast: Negative for mass, mastodynia, nipple discharge and skin changes     Objective:     Vital Signs (Most Recent):  Temp: 98.3 °F (36.8 °C) (19)  Pulse: 81 (19)  Resp: 16 (19)  BP: (!) 104/53 (19)  SpO2: 100 % (19) Vital Signs (24h Range):  Temp:  [98.3 °F (36.8 °C)] 98.3 °F (36.8 °C)  Pulse:   [] 81  Resp:  [16-18] 16  SpO2:  [98 %-100 %] 100 %  BP: ()/(46-67) 104/53     Weight: 58 kg (127 lb 13.9 oz)  Body mass index is 21.95 kg/m².      Physical Exam:   Constitutional: She is oriented to person, place, and time. She appears well-developed and well-nourished.    HENT:   Head: Normocephalic and atraumatic.    Eyes: Pupils are equal, round, and reactive to light. EOM are normal.     Cardiovascular: Exam reveals no clubbing and no cyanosis.     Pulmonary/Chest: Effort normal.        Abdominal: Soft. She exhibits no mass. There is no rebound and no guarding. No hernia.     Genitourinary:   Genitourinary Comments: Scant blood noted on the pad  Cervix appears dilated at 2 cm. POC palpated at KALYANI/internal os           Musculoskeletal: Normal range of motion and moves all extremeties.       Neurological: She is alert and oriented to person, place, and time.    Skin: Skin is warm. No cyanosis. Nails show no clubbing.    Psychiatric: She has a normal mood and affect. Her behavior is normal. Thought content normal.       Significant Labs:  Lab Results   Component Value Date    GROUPTR O POS 09/24/2019       I have personallly reviewed all pertinent lab results from the last 24 hours.

## 2019-09-25 NOTE — ASSESSMENT & PLAN NOTE
S/p medical management with active bleeding and retained POC  Pt was counseled for suction D&C  She is consented, all questions answered to her satisfaction  Rh positive

## 2019-09-25 NOTE — OP NOTE
PROCEDURE NOTE    Preop Diagnosis: blighted ovum, incomplete SAB after medical treatment    Postop Diagnosis: Same    Procedure: Suction Dilation and Curettage    Surgeon: Veronica Chavarria MD    Anesthesia: general    Findings: large amount of POC noted    Complications: None    EBL: 50 cc    Pathology: POC    Blood Type: rh positive    Indications:     Pt had cytotec but not able to pass tissue with significant vaginal bleeding, she was counseled for surgical management of pregnancy loss by D&C, All risks/benefits discussed. Pt understood and wished to proceed. All questions answered.    Procedure:     The patient was taken to the operating room where general anesthesia was found to be adequate. She was then prepped and draped in the lithotomy position. She voided on call to OR, so no catheterization was needed. An appropriate time-out was performed.    A bi-valved speculum was placed in the patient's vagina and a single-toothed tenaculum was placed on the anterior lip of the cervix.  Cervix was dilated to 3 cm, POC at KALYANI and internal os.  A size 7 flexible, curved suction curette was placed. A large amount of typical products-of-conception were removed. Intravenous pitocin was started at this time. The suction was removed and a gentle sharp curettage was performed. Curettage was started at the fundus, moving distally throughout the uterus. As minimal curettage as possible was performed. The uterus was felt to be empty of products of conception, and the suction was replaced once more to remove any additional tissue or blood. Intramuscular methergine was given at this time by anesthesia. The tenaculum was removed and was hemostatic after pressure applied, then all instruments were removed.    The patient tolerated the procedure well. Sponge and lap counts were correct x 2.    The patient was taken to the recovery room in stable condition.    Jay Chavarria MD

## 2019-09-25 NOTE — HPI
Pt is a 28 yo  with blighted ovum measured around 7-9 weeks gestation.  She was prescribed cytotec for medical management.  Pt reported heavy bleeding onset for the past 24 hours where she changes 3-4 pads/hour. Unsure if she passed POC.  Pelvic US in the ED shows retained POC

## 2019-09-25 NOTE — ED TRIAGE NOTES
Pt presents to the ED with c/o vaginal bleeding that has been going on for the past 2 days. Pt reports going through 3 pads the last hour. Also reports lower abdominal pain. Denies any chest pain or SOB.

## 2019-09-30 ENCOUNTER — OFFICE VISIT (OUTPATIENT)
Dept: OBSTETRICS AND GYNECOLOGY | Facility: CLINIC | Age: 29
End: 2019-09-30
Payer: MEDICAID

## 2019-09-30 VITALS
BODY MASS INDEX: 21.68 KG/M2 | DIASTOLIC BLOOD PRESSURE: 62 MMHG | HEIGHT: 64 IN | WEIGHT: 127 LBS | SYSTOLIC BLOOD PRESSURE: 108 MMHG

## 2019-09-30 DIAGNOSIS — Z30.45 INITIAL ENCOUNTER FOR MANAGEMENT OF CONTRACEPTIVE PATCH USE: Primary | ICD-10-CM

## 2019-09-30 PROCEDURE — 99999 PR PBB SHADOW E&M-EST. PATIENT-LVL III: CPT | Mod: PBBFAC,,, | Performed by: OBSTETRICS & GYNECOLOGY

## 2019-09-30 PROCEDURE — 99213 OFFICE O/P EST LOW 20 MIN: CPT | Mod: S$PBB,,, | Performed by: OBSTETRICS & GYNECOLOGY

## 2019-09-30 PROCEDURE — 99213 PR OFFICE/OUTPT VISIT, EST, LEVL III, 20-29 MIN: ICD-10-PCS | Mod: S$PBB,,, | Performed by: OBSTETRICS & GYNECOLOGY

## 2019-09-30 PROCEDURE — 99999 PR PBB SHADOW E&M-EST. PATIENT-LVL III: ICD-10-PCS | Mod: PBBFAC,,, | Performed by: OBSTETRICS & GYNECOLOGY

## 2019-09-30 PROCEDURE — 99213 OFFICE O/P EST LOW 20 MIN: CPT | Mod: PBBFAC | Performed by: OBSTETRICS & GYNECOLOGY

## 2019-09-30 RX ORDER — NORELGESTROMIN AND ETHINYL ESTRADIOL 35; 150 UG/MG; UG/MG
1 PATCH TRANSDERMAL
Qty: 12 PATCH | Refills: 3 | Status: SHIPPED | OUTPATIENT
Start: 2019-09-30 | End: 2020-09-29

## 2019-10-17 NOTE — DISCHARGE SUMMARY
[facility]  Discharge Note  Short Stay    Admit Date: 2019    Discharge Date and Time: 2019  3:30 AM     Attending Physician: Veronica Chavarria    Discharge Provider: Jay Chavarria    Diagnoses:  Active Hospital Problems    Diagnosis  POA    *Incomplete  [O03.4]  Yes    Vaginal bleeding [N93.9]  Yes      Resolved Hospital Problems   No resolved problems to display.       Discharged Condition: good    Hospital Course: Patient was admitted for an outpatient procedure and tolerated the procedure well with no complications.    Final Diagnoses: Same as principal problem.    Disposition: Home or Self Care    Follow up/Patient Instructions:    Medications:  Reconciled Home Medications:      Medication List      CONTINUE taking these medications    ibuprofen 800 MG tablet  Commonly known as:  ADVIL,MOTRIN  Take 1 tablet (800 mg total) by mouth every 8 (eight) hours as needed for Pain.     promethazine 25 MG tablet  Commonly known as:  PHENERGAN  Take 1/2-1 pill po q 8 hr prn nausea/emesis        STOP taking these medications    miSOPROStol 200 MCG Tab  Commonly known as:  CYTOTEC     prenatal vit 10-iron-folic-dha 65-1-250 mg Cmpk  Commonly known as:  VITAFOL-OB+DHA        ASK your doctor about these medications    methylergonovine 0.2 mg tablet  Commonly known as:  METHERGINE  Take 1 tablet (0.2 mg total) by mouth every 6 (six) hours. for 3 doses  Ask about: Should I take this medication?          No discharge procedures on file.  Follow-up Information     Krunal Chavez MD In 2 weeks.    Specialties:  Obstetrics, Obstetrics and Gynecology  Contact information:  [address]  [address] [place] [id number] [phone]                   Discharge Procedure Orders (must include Diet, Follow-up, Activity):  No discharge procedures on file.

## 2019-11-12 ENCOUNTER — OFFICE VISIT (OUTPATIENT)
Dept: OBSTETRICS AND GYNECOLOGY | Facility: CLINIC | Age: 29
End: 2019-11-12
Payer: MEDICAID

## 2019-11-12 VITALS — WEIGHT: 132 LBS | HEIGHT: 64 IN | BODY MASS INDEX: 22.53 KG/M2

## 2019-11-12 DIAGNOSIS — Z30.09 COUNSELING FOR BIRTH CONTROL REGARDING INTRAUTERINE DEVICE (IUD): Primary | ICD-10-CM

## 2019-11-12 PROCEDURE — 99213 PR OFFICE/OUTPT VISIT, EST, LEVL III, 20-29 MIN: ICD-10-PCS | Mod: S$PBB,24,, | Performed by: OBSTETRICS & GYNECOLOGY

## 2019-11-12 PROCEDURE — 99999 PR PBB SHADOW E&M-EST. PATIENT-LVL II: ICD-10-PCS | Mod: PBBFAC,,, | Performed by: OBSTETRICS & GYNECOLOGY

## 2019-11-12 PROCEDURE — 99999 PR PBB SHADOW E&M-EST. PATIENT-LVL II: CPT | Mod: PBBFAC,,, | Performed by: OBSTETRICS & GYNECOLOGY

## 2019-11-12 PROCEDURE — 99212 OFFICE O/P EST SF 10 MIN: CPT | Mod: PBBFAC | Performed by: OBSTETRICS & GYNECOLOGY

## 2019-11-12 PROCEDURE — 99213 OFFICE O/P EST LOW 20 MIN: CPT | Mod: S$PBB,24,, | Performed by: OBSTETRICS & GYNECOLOGY

## 2019-11-13 PROBLEM — O02.0 BLIGHTED OVUM: Status: RESOLVED | Noted: 2019-09-16 | Resolved: 2019-11-13

## 2019-11-13 PROBLEM — O03.4 INCOMPLETE ABORTION: Status: RESOLVED | Noted: 2019-09-25 | Resolved: 2019-11-13

## 2019-11-13 NOTE — PROGRESS NOTES
"Cc:  Contraceptive patch f/u    HPI:  29 yro  who was seen 10/5/19 following a blighted ovum and D&C.  Pt was started on Xulane patches; however, pt is not happy with this form of contraception because they are falling off of her.  PT desires alternate, reversible contraception.  Pt is interested in long term contraception.    Vitals:    19 1402   Weight: 59.9 kg (132 lb)   Height: 5' 4" (1.626 m)     30 minute discussion  Winti used to discuss contraceptive options.  Pt would not be reliable with OCP's.  Pt is worried about losing insurance and does not desire depoprovera or nexplanon.  Pt is interestsed in IUD's and also interested in menstrual benefits.  Discussed Mirena would be best choice for pt.    Physical Exam   Constitutional: She is oriented to person, place, and time. She appears well-developed and well-nourished. No distress.   Neck: Normal range of motion.   Cardiovascular: Normal rate.   Pulmonary/Chest: Effort normal. No respiratory distress.   Abdominal: Soft. She exhibits no distension.   Musculoskeletal: Normal range of motion.   Neurological: She is alert and oriented to person, place, and time. No cranial nerve deficit. Coordination normal.   Skin: She is not diaphoretic.   Psychiatric: She has a normal mood and affect. Her behavior is normal. Judgment and thought content normal.     Assessment/Plan  1. Counseling for birth control regarding intrauterine device (IUD)  levonorgestrel (MIRENA) 20 mcg/24 hours (5 yrs) 52 mg IUD     Mirena to be ordered; pt to continue with patch for now.  Pt will be contacted for placement when device is available.     "

## 2020-01-01 NOTE — PROGRESS NOTES
"Cc:  Contraceptive initiation    HPI:  29 yor  who had recent blighted ovum who required D&C on 19 after oral cytotec administration presents to discuss contraception.  Pt does desire future fertility, just not at this time.  Martti  used for disussion.    Vitals:    19 1336   BP: 108/62   Weight: 57.6 kg (127 lb)   Height: 5' 4" (1.626 m)     Physical Exam   Constitutional: She is oriented to person, place, and time. She appears well-developed and well-nourished. No distress.   Neck: Normal range of motion.   Cardiovascular: Normal rate.   Pulmonary/Chest: Effort normal. No respiratory distress.   Abdominal: Soft. She exhibits no distension. There is no guarding.   Musculoskeletal: Normal range of motion.   Neurological: She is alert and oriented to person, place, and time. No cranial nerve deficit. Coordination normal.   Skin: She is not diaphoretic.   Psychiatric: She has a normal mood and affect. Her behavior is normal. Judgment and thought content normal.   Vitals reviewed.      Assessment/Plan  1. Initial encounter for management of contraceptive patch use  norelgestromin-ethinyl estradiol (ORTHO EVRA) 150-35 mcg/24 hr     Discussed various forms of contraception, pt is most willing to use contraceptive patch, pt instructed on its use; pt to begin today.  " infant NPO/ from mother/prematurity

## 2020-01-06 ENCOUNTER — TELEPHONE (OUTPATIENT)
Dept: PHARMACY | Facility: CLINIC | Age: 30
End: 2020-01-06

## 2020-01-06 NOTE — TELEPHONE ENCOUNTER
Spoke with patient regarding approval of Mirena under her pharmacy benefits with copay $0. She declines consult and gives [facility] permission to arrange shipment to Dr. Sandhu' office.

## (undated) DEVICE — SEE MEDLINE ITEM 157117

## (undated) DEVICE — KIT CANIST SUCTION 1200CC

## (undated) DEVICE — SHEET DRAPE FAN-FOLDED 3/4

## (undated) DEVICE — BLANKET UPPER BODY 78.7X29.9IN

## (undated) DEVICE — SEE MEDLINE ITEM 156946

## (undated) DEVICE — PAD PREP 50/CA

## (undated) DEVICE — GLOVE SURGICAL LATEX SZ 6

## (undated) DEVICE — SEE L#120831

## (undated) DEVICE — SEE MEDLINE ITEM 152622

## (undated) DEVICE — JELLY LUBRICANT STERILE 4 OZ

## (undated) DEVICE — CANISTER SUCTION 2 LTR

## (undated) DEVICE — SEE MEDLINE ITEM 154981

## (undated) DEVICE — DRESSING TELFA PAD N ADH 2X3

## (undated) DEVICE — TUBE TANDUM 18IN DISP

## (undated) DEVICE — PAD SANITARY OB STERILE

## (undated) DEVICE — Device

## (undated) DEVICE — CATH SELF-CATH FEMALE 14FR 6IN

## (undated) DEVICE — SET DISPOSABLE COLLECTION

## (undated) DEVICE — COVER OVERHEAD SURG LT BLUE

## (undated) DEVICE — VACURETTE 7MM CURVED

## (undated) DEVICE — POSITIONER HEAD DONUT 9IN FOAM